# Patient Record
Sex: MALE | HISPANIC OR LATINO
[De-identification: names, ages, dates, MRNs, and addresses within clinical notes are randomized per-mention and may not be internally consistent; named-entity substitution may affect disease eponyms.]

---

## 2020-03-24 PROBLEM — Z00.00 ENCOUNTER FOR PREVENTIVE HEALTH EXAMINATION: Status: ACTIVE | Noted: 2020-03-24

## 2020-04-01 ENCOUNTER — APPOINTMENT (OUTPATIENT)
Dept: NEUROLOGY | Facility: CLINIC | Age: 45
End: 2020-04-01
Payer: MEDICAID

## 2020-04-01 DIAGNOSIS — Z78.9 OTHER SPECIFIED HEALTH STATUS: ICD-10-CM

## 2020-04-01 DIAGNOSIS — Z87.74 PERSONAL HISTORY OF (CORRECTED) CONGENITAL MALFORMATIONS OF HEART AND CIRCULATORY SYSTEM: ICD-10-CM

## 2020-04-01 PROCEDURE — 99442: CPT

## 2020-04-01 NOTE — DISCUSSION/SUMMARY
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

## 2020-04-01 NOTE — DATA REVIEWED
[de-identified] : MRI brain 2016 personally reviewed ([facility]), notable for left mid-temporal resection\par  [de-identified] : PET brain 2018 personally reviewed ([facility]), notable for left mid-temporal resection and hypometabolism\par  [de-identified] : [facility]: FSIQ 100, deficits in naming and rote verbal memory\par [place], [facility] 2009: left language dominance, memory supported by right temporal lobe with some function in the left temporal lobe

## 2020-04-01 NOTE — HISTORY OF PRESENT ILLNESS
[Home] : at home, [unfilled] , at the time of the visit. [Other Location: e.g. Home (Enter Location, City,State)___] : at [unfilled] [Patient] : the patient [FreeTextEntry1] : Visit conducted via telephone due to COVID19 emergency.\par Verbal consent obtained from patient to conduct visit via telephone.\par Time spent on phone: 16 minutes\par \par 44-year-old man with left temporal epilepsy, well known to me as he was previously a patient of mine at [facility].\par \par Briefly, seizures started in 1997 and were characterized by a metallic taste or smell with or without confusion.  He was started on Dilantin but seizures persisted.  MRI showed a left temporal AVM.  He had a single tonic-clonic seizure in 1999.  He underwent 3 embolizations and 2 gamma knife procedures in [id number], without improvement in seizure control.  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  He then underwent AVM resection in May 2009 which was complicated by right cerebellar hemorrhage.  He was switched from Dilantin to Keppra and then to Lamictal and then Depakote due to ongoing seizures.  Angio in November 2017 showed no AVM recurrence, but seizures persisted.\par \par Because of delays in surgical work up at [facility], he was referred to [facility] for surgical evaluation.  He underwent left temporal intracranial EEG evaluation in February 2020.  Evaluation showed seizure onset in the anterior temporal strip immediately posterior to prior AVM resection, with spread to the deep contacts of the anterior and posterior medial temporal depth electrodes and late spread to the deep contacts of the anterior insula electrode.  Cortical stimulation mapping identified language areas posterior to the seizure onset and early spread zone.  He underwent a tailored left ning-mesial temporal resection.  He was discharged on his pre-op regimen for Vimpat 200 mg BID and Onfi 60 mg QHS.\par \par He is now 1 month out from surgery and is feeling well.  He reports that he has not had any seizures since the operation.  He has noticed some difficulties with verbal memory, forgetting words in the middle of sentences.  He thinks this was most noticeable while he was on oxycodone immediately postoperatively, and that it has been improving since then.  He is now no longer have pain and has stopped taking this medication, but still notices that his memory is not quite back to his preoperative baseline.  He denies numbness, weakness, or tingling.  His mood has been good and he denies anxiety or depression.\par \par

## 2020-07-13 ENCOUNTER — APPOINTMENT (OUTPATIENT)
Dept: NEUROLOGY | Facility: CLINIC | Age: 45
End: 2020-07-13
Payer: MEDICAID

## 2020-07-13 VITALS
TEMPERATURE: 98.4 F | OXYGEN SATURATION: 95 % | HEIGHT: 73 IN | SYSTOLIC BLOOD PRESSURE: 134 MMHG | HEART RATE: 88 BPM | DIASTOLIC BLOOD PRESSURE: 87 MMHG

## 2020-07-13 PROCEDURE — 99214 OFFICE O/P EST MOD 30 MIN: CPT

## 2020-07-13 RX ORDER — LACOSAMIDE 200 MG/1
200 TABLET, FILM COATED ORAL
Qty: 180 | Refills: 1 | Status: DISCONTINUED | COMMUNITY
Start: 2020-04-01 | End: 2020-07-13

## 2020-07-13 RX ORDER — SERTRALINE HYDROCHLORIDE 100 MG/1
100 TABLET, FILM COATED ORAL DAILY
Qty: 180 | Refills: 1 | Status: DISCONTINUED | COMMUNITY
End: 2020-07-13

## 2020-07-13 RX ORDER — CLOBAZAM 20 MG/1
20 TABLET ORAL
Qty: 270 | Refills: 1 | Status: DISCONTINUED | COMMUNITY
Start: 2020-04-01 | End: 2020-07-13

## 2020-07-13 NOTE — DISCUSSION/SUMMARY
[FreeTextEntry1] : Intractable left temporal epilepsy 2/2 AVM, now seizure-free since left anteromesial temporal resection in February 2020.  Exam notable for RUQ field cut as well as mild word finding difficulty, both likely attributable to his resection.\par \par Will continue medications at current doses for now.  If he remains seizure-free for 1 year postoperatively, can consider beginning to taper.\par \par 1) Focal epilepsy \par -Vimpat 200 mg BID\par -Onfi 60 mg QHS\par \par 2) Depression\par -Sertraline 200 mg daily\par \par RTC 3 months

## 2020-07-13 NOTE — HISTORY OF PRESENT ILLNESS
[FreeTextEntry1] : 44-year-old man with left temporal epilepsy 2/2 AVM, s/p multiple embolizations, resections, and gamma knife procedures at [facility], followed by intractable epilepsy, now s/p intracranial EEG monitoring and left anteromesial temporal resection at [facility] in February 2020, presenting for follow up.\par \par No seizures since last visit (still seizure-free since surgery).  He continues to have word-finding and memory difficulty but feels this is improving.  Mood has been good.  \par \par He is curious about when he can begin tapering AEDs.

## 2020-07-13 NOTE — PHYSICAL EXAM
[FreeTextEntry1] : Physical Exam\par Constitutional: no apparent distress\par Psychiatric: normal affect, euthyhmic, alert and oriented x 3\par \par Neurologic Exam:\par Mental Status: awake and alert, oriented to time, place, and person, follows simple and complex commands, naming intact for high but not low frequency objects, remembers 3/3 at 5 minutes\par Cranial Nerves: I: deferred; II: pupils equal round and reactive, +RUQ field cut III, IV, VI: extraocular movements full with no nystagmus; V: facial sensation intact and symmetric; VII: facial power symmetric; VIII: hearing intact to finger rub; IX/X: palate elevates symmetrically, no dysarthria; XI: shoulder shrug symmetric; XII: tongue protrudes midline\par Motor: normal bulk and tone, no orbiting or pronator drift, power 5/5 to confrontation throughout including shoulder abduction, elbow flexion and extension, wrist flexion and extension, hip flexion, knee flexion and extension, plantarflexion, and dorsiflexion, no abnormal movements\par Sensation: intact to light touch in distal upper and lower extremities bilaterally\par Coordination: finger-nose-finger intact bilaterally\par Reflexes: 2+ biceps, triceps, brachioradialis, patella\par Gait: narrow base, normal stance and stride, normal arm swing

## 2020-10-30 ENCOUNTER — APPOINTMENT (OUTPATIENT)
Dept: NEUROLOGY | Facility: CLINIC | Age: 45
End: 2020-10-30
Payer: MEDICAID

## 2020-10-30 PROCEDURE — 99214 OFFICE O/P EST MOD 30 MIN: CPT | Mod: 95

## 2020-10-30 NOTE — HISTORY OF PRESENT ILLNESS
[Home] : at home, [unfilled] , at the time of the visit. [Medical Office: ([facility])___] : at the medical office located in  [Verbal consent obtained from patient] : the patient, [unfilled] [FreeTextEntry1] : 44-year-old man with left temporal epilepsy 2/2 AVM, s/p multiple embolizations, resections, and gamma knife procedures at [facility], followed by intractable epilepsy, now s/p left anteromesial temporal resection at [facility] in February 2020.\par \par Remains seizure-free since last visit.  Still has minor word-finding difficulties, but is not very bothered by this as he is very happy to be seizure-free.  Mood has been good.  Eager to start tapering AEDs.\par \par

## 2020-10-30 NOTE — DISCUSSION/SUMMARY
[FreeTextEntry1] : Intractable left temporal epilepsy 2/2 AVM, now seizure-free since left anteromesial temporal resection in February 2020. \par \par Will continue medications at current doses for now, obtain ambulatory EEG in February 2021 (1 year post-op) and begin slowly tapering medications if he remains seizure-free and EEG shows no epileptiform activity.\par \par 1) Focal epilepsy \par -Vimpat 200 mg BID\par -Onfi 60 mg QHS\par \par 2) Depression\par -Sertraline 200 mg daily\par \par RTC February 2021 after EEG\par

## 2020-12-04 ENCOUNTER — APPOINTMENT (OUTPATIENT)
Dept: NEUROLOGY | Facility: CLINIC | Age: 45
End: 2020-12-04
Payer: MEDICAID

## 2020-12-04 PROCEDURE — 99072 ADDL SUPL MATRL&STAF TM PHE: CPT

## 2020-12-04 PROCEDURE — 95816 EEG AWAKE AND DROWSY: CPT

## 2020-12-05 PROCEDURE — 95708 EEG WO VID EA 12-26HR UNMNTR: CPT

## 2020-12-06 PROCEDURE — 99072 ADDL SUPL MATRL&STAF TM PHE: CPT

## 2020-12-06 PROCEDURE — 95721 EEG PHY/QHP>36<60 HR W/O VID: CPT

## 2020-12-06 PROCEDURE — 95700 EEG CONT REC W/VID EEG TECH: CPT

## 2020-12-06 PROCEDURE — 95708 EEG WO VID EA 12-26HR UNMNTR: CPT

## 2020-12-07 ENCOUNTER — APPOINTMENT (OUTPATIENT)
Dept: NEUROLOGY | Facility: CLINIC | Age: 45
End: 2020-12-07

## 2021-10-18 ENCOUNTER — APPOINTMENT (OUTPATIENT)
Dept: NEUROLOGY | Facility: CLINIC | Age: 46
End: 2021-10-18
Payer: MEDICAID

## 2021-10-18 VITALS
WEIGHT: 214 LBS | TEMPERATURE: 98 F | HEIGHT: 73 IN | SYSTOLIC BLOOD PRESSURE: 136 MMHG | OXYGEN SATURATION: 95 % | DIASTOLIC BLOOD PRESSURE: 91 MMHG | HEART RATE: 98 BPM | BODY MASS INDEX: 28.36 KG/M2

## 2021-10-18 PROCEDURE — 99214 OFFICE O/P EST MOD 30 MIN: CPT

## 2021-10-18 NOTE — HISTORY OF PRESENT ILLNESS
[FreeTextEntry1] : Last seen October 2020. Reports that he has remained seizure-free since then -- no abnormal smells, tastes, sounds, alterations of awareness or any other symptoms concerning for seizures.  Word finding difficulties have improved as well.  Only residual deficit is field cut.  He is eager to start reducing AEDs.\par \par Mood is good.  Still following with a psychiatrist and remains on sertraline.\par \par He is bothered by his slight skull deformity from the surgery and would like to know if this can be repaired.

## 2021-10-18 NOTE — PHYSICAL EXAM
[FreeTextEntry1] : Physical Exam\par Constitutional: no apparent distress\par Psychiatric: normal affect, euthymic, alert and oriented x 3\par \par Neurologic Exam:\par Mental Status: awake and alert, speech fluent and prosodic with no paraphasic errors\par Cranial Nerves: I: deferred; II: pupils equal round and reactive, R upper quadrant field cut III, IV, VI: extraocular movements full with no nystagmus; V: facial sensation intact and symmetric; VII: facial power symmetric; VIII: hearing intact to finger rub; IX/X: palate elevates symmetrically, no dysarthria; XI: shoulder shrug symmetric; XII: tongue protrudes midline\par Motor: normal bulk and tone, no orbiting or pronator drift, power 5/5 to confrontation throughout including shoulder abduction, elbow flexion and extension, wrist flexion and extension, hip flexion, knee flexion and extension, no abnormal movements\par Sensation: intact to light touch in distal upper and lower extremities bilaterally\par Coordination: finger-nose-finger intact bilaterally\par Gait: narrow base, normal stance and stride\par

## 2021-10-18 NOTE — ASSESSMENT
[FreeTextEntry1] : 1) Focal epilepsy -- seizure-free for nearly 2 years (1 year 8 months) after left anteromesial temporal resection.\par -Will reduce Onfi from 60 mg QHS to 50 mg QHS\par -Continue Vimpat 200 mg BID \par \par 2) Depression\par -Continue sertraline, follow up with psychiatrist\par \par 3) Slight skull deformity\par -Advised discussing with surgeon (Alexis Hawkins at [facility]), but it is very mild and I doubt surgical revision will be recommended\par \par RTC 3 months

## 2022-01-20 ENCOUNTER — APPOINTMENT (OUTPATIENT)
Dept: NEUROLOGY | Facility: CLINIC | Age: 47
End: 2022-01-20
Payer: MEDICAID

## 2022-01-20 PROCEDURE — 99442: CPT | Mod: 95

## 2022-01-20 NOTE — REASON FOR VISIT
[Home] : at home, [unfilled] , at the time of the visit. [Medical Office: ([facility])___] : at the medical office located in  [Verbal consent obtained from patient] : the patient, [unfilled] [Follow-Up: _____] : a [unfilled] follow-up visit

## 2022-05-09 ENCOUNTER — APPOINTMENT (OUTPATIENT)
Dept: NEUROLOGY | Facility: CLINIC | Age: 47
End: 2022-05-09
Payer: MEDICAID

## 2022-05-09 ENCOUNTER — NON-APPOINTMENT (OUTPATIENT)
Age: 47
End: 2022-05-09

## 2022-05-09 VITALS
TEMPERATURE: 98.4 F | DIASTOLIC BLOOD PRESSURE: 96 MMHG | SYSTOLIC BLOOD PRESSURE: 167 MMHG | BODY MASS INDEX: 29.55 KG/M2 | OXYGEN SATURATION: 97 % | WEIGHT: 223 LBS | HEART RATE: 85 BPM | HEIGHT: 73 IN

## 2022-05-09 PROCEDURE — 99214 OFFICE O/P EST MOD 30 MIN: CPT

## 2022-05-09 NOTE — ASSESSMENT
[FreeTextEntry1] : 1) Focal epilepsy -- seizure-free x 2 years after left anteromesial temporal resection, now slowly reducing AEDs.\par -Will reduce Onfi from 40 mg QHS to 30 mg QHS\par -Continue Vimpat 200 mg BID \par \par 2) Depression\par -Continue sertraline, follow up with psychiatrist\par \par RTC 3 months\par

## 2022-05-09 NOTE — HISTORY OF PRESENT ILLNESS
[FreeTextEntry1] : Remains seizure-free on Vimpat 200 mg BID and Onfi 40 mg QHS.\par Minor word-finding and writing difficulties persist.\par Remains on sertraline for depression, psychiatrist is considering lowering the dose.\par Plans to start looking for a job.

## 2022-05-09 NOTE — HISTORY OF PRESENT ILLNESS
[FreeTextEntry1] : No video due to technical difficulties with [employer].\par \par No seizures since last visit.  Continues on Vimpat 200 mg BID and Onfi 50 mg QHS, eager to continue reducing medications.  Word-finding difficulties occasionally present when he is tired, but manageable.  Field cut persists.\par \par Mood is stable, continues on sertraline, still follow with psychiatrist.

## 2022-05-09 NOTE — ASSESSMENT
[FreeTextEntry1] : 1) Focal epilepsy -- seizure-free x 2 years after left anteromesial temporal resection, now slowly reducing AEDs.\par -Will reduce Onfi from 50 mg QHS to 40 mg QHS\par -Continue Vimpat 200 mg BID \par \par 2) Depression\par -Continue sertraline, follow up with psychiatrist\par \par RTC 3 months

## 2023-10-26 ENCOUNTER — NON-APPOINTMENT (OUTPATIENT)
Age: 48
End: 2023-10-26

## 2023-11-02 ENCOUNTER — APPOINTMENT (OUTPATIENT)
Dept: NEUROLOGY | Facility: CLINIC | Age: 48
End: 2023-11-02
Payer: MEDICAID

## 2023-11-02 VITALS
TEMPERATURE: 98.1 F | HEIGHT: 73 IN | OXYGEN SATURATION: 97 % | SYSTOLIC BLOOD PRESSURE: 160 MMHG | HEART RATE: 77 BPM | DIASTOLIC BLOOD PRESSURE: 106 MMHG | BODY MASS INDEX: 29.82 KG/M2 | WEIGHT: 225 LBS

## 2023-11-02 VITALS — OXYGEN SATURATION: 97 % | HEART RATE: 78 BPM | SYSTOLIC BLOOD PRESSURE: 164 MMHG | DIASTOLIC BLOOD PRESSURE: 126 MMHG

## 2023-11-02 DIAGNOSIS — Z86.59 PERSONAL HISTORY OF OTHER MENTAL AND BEHAVIORAL DISORDERS: ICD-10-CM

## 2023-11-02 PROCEDURE — 99214 OFFICE O/P EST MOD 30 MIN: CPT

## 2023-11-02 RX ORDER — SERTRALINE HYDROCHLORIDE 100 MG/1
100 TABLET, FILM COATED ORAL DAILY
Qty: 180 | Refills: 1 | Status: DISCONTINUED | COMMUNITY
Start: 2020-07-13 | End: 2023-11-02

## 2023-11-02 RX ORDER — CLOBAZAM 20 MG/1
20 TABLET ORAL DAILY
Qty: 135 | Refills: 1 | Status: DISCONTINUED | COMMUNITY
Start: 2020-07-13 | End: 2023-11-02

## 2023-11-28 ENCOUNTER — RX RENEWAL (OUTPATIENT)
Age: 48
End: 2023-11-28

## 2024-03-15 ENCOUNTER — APPOINTMENT (OUTPATIENT)
Dept: NEUROLOGY | Facility: CLINIC | Age: 49
End: 2024-03-15

## 2024-03-28 ENCOUNTER — APPOINTMENT (OUTPATIENT)
Dept: NEUROLOGY | Facility: CLINIC | Age: 49
End: 2024-03-28
Payer: MEDICAID

## 2024-03-28 VITALS
TEMPERATURE: 98.2 F | BODY MASS INDEX: 27.63 KG/M2 | WEIGHT: 204 LBS | OXYGEN SATURATION: 97 % | HEIGHT: 72 IN | DIASTOLIC BLOOD PRESSURE: 94 MMHG | HEART RATE: 75 BPM | SYSTOLIC BLOOD PRESSURE: 138 MMHG

## 2024-03-28 DIAGNOSIS — G40.109 LOCALIZATION-RELATED (FOCAL) (PARTIAL) SYMPTOMATIC EPILEPSY AND EPILEPTIC SYNDROMES WITH SIMPLE PARTIAL SEIZURES, NOT INTRACTABLE, W/OUT STATUS EPILEPTICUS: ICD-10-CM

## 2024-03-28 PROCEDURE — 99214 OFFICE O/P EST MOD 30 MIN: CPT

## 2024-03-28 RX ORDER — CLOBAZAM 10 MG/1
10 TABLET ORAL
Refills: 0 | Status: COMPLETED | COMMUNITY
End: 2024-03-28

## 2024-03-28 RX ORDER — LACOSAMIDE 200 MG/1
200 TABLET ORAL
Qty: 180 | Refills: 1 | Status: ACTIVE | COMMUNITY
Start: 2020-07-13 | End: 1900-01-01

## 2024-03-28 NOTE — DISCUSSION/SUMMARY
[FreeTextEntry1] : Impression: 1) Focal epilepsy - now s/p L anteromesial temporal resection in 2020. No events since surgery, remains seizure-free off of Onfi 2) Mood issues - stable   Plan: 1) Continue Vimpat 200mg BID 2) Follow up with Dr. Alicia in 4-6 months

## 2024-03-28 NOTE — PHYSICAL EXAM
[FreeTextEntry1] : General: Constitutional: Sitting comfortably in NAD. Psychiatric: well-groomed, appropriate affect Ears, Nose, Throat: no abnormalities, mucus membranes moist Neck: supple Extremities: no edema, clubbing or cyanosis Skin: no rash or neuro-cutaneous signs  Cognitive: Orientation, language, memory and knowledge screens intact.  Cranial Nerves: II: DONTE. III/IV/VI: EOM Full. VII: Face appears symmetric. VIII: Normal to screening. IX/X: Normal phonation. XI: Trapezius Symmetric. XII: Tongue midline.  Motor: No tremor Power: No pronator drift.  Normal gait.

## 2024-03-28 NOTE — HISTORY OF PRESENT ILLNESS
[FreeTextEntry1] : 3/28/24 HPI: Girish is a 48 year old male s/p L anteromesial temporal resection for seizures, presenting for a follow up visit. Pt following regularly with Dr. Alicia.  Discontinued Onfi in November, continues Vimpat 200mg BID. No questionable seizures or auras since.   Mood continues to stay stable off of Sertraline.

## 2024-10-11 ENCOUNTER — APPOINTMENT (OUTPATIENT)
Dept: NEUROLOGY | Facility: CLINIC | Age: 49
End: 2024-10-11
Payer: MEDICAID

## 2024-10-11 ENCOUNTER — NON-APPOINTMENT (OUTPATIENT)
Age: 49
End: 2024-10-11

## 2024-10-11 VITALS
SYSTOLIC BLOOD PRESSURE: 134 MMHG | TEMPERATURE: 98.4 F | OXYGEN SATURATION: 98 % | WEIGHT: 214.4 LBS | DIASTOLIC BLOOD PRESSURE: 87 MMHG | HEART RATE: 66 BPM | BODY MASS INDEX: 29.08 KG/M2

## 2024-10-11 PROCEDURE — G2211 COMPLEX E/M VISIT ADD ON: CPT | Mod: NC

## 2024-10-11 PROCEDURE — 99214 OFFICE O/P EST MOD 30 MIN: CPT

## 2024-10-11 RX ORDER — VALSARTAN 160 MG/1
160 TABLET, COATED ORAL
Refills: 0 | Status: ACTIVE | COMMUNITY

## 2024-10-11 RX ORDER — CHLORTHALIDONE 25 MG/1
25 TABLET ORAL
Refills: 0 | Status: ACTIVE | COMMUNITY